# Patient Record
Sex: FEMALE | Race: WHITE | NOT HISPANIC OR LATINO
[De-identification: names, ages, dates, MRNs, and addresses within clinical notes are randomized per-mention and may not be internally consistent; named-entity substitution may affect disease eponyms.]

---

## 2017-10-10 ENCOUNTER — RESULT REVIEW (OUTPATIENT)
Age: 57
End: 2017-10-10

## 2022-11-02 ENCOUNTER — APPOINTMENT (OUTPATIENT)
Dept: ORTHOPEDIC SURGERY | Facility: CLINIC | Age: 62
End: 2022-11-02

## 2022-11-02 DIAGNOSIS — M47.816 SPONDYLOSIS W/OUT MYELOPATHY OR RADICULOPATHY, LUMBAR REGION: ICD-10-CM

## 2022-11-02 PROCEDURE — 72170 X-RAY EXAM OF PELVIS: CPT

## 2022-11-02 PROCEDURE — 99214 OFFICE O/P EST MOD 30 MIN: CPT

## 2022-11-02 PROCEDURE — 72110 X-RAY EXAM L-2 SPINE 4/>VWS: CPT

## 2022-11-02 NOTE — HISTORY OF PRESENT ILLNESS
[Lower back] : lower back [Gradual] : gradual [Sudden] : sudden [6] : 6 [5] : 5 [Burning] : burning [Shooting] : shooting [Stabbing] : stabbing [Intermittent] : intermittent [Rest] : rest [Exercising] : exercising [Retired] : Work status: retired [de-identified] : 11/02/2022: 62 year old female here for eval of Left sided low back pain for about a month. No injury. Pain in buttock with radiation down posterior leg into plantar foot. Denies Right sided radicular sx/ N/ T/ weakness. Denies b/b incontinence. Pain worse with standing/ walking long periods.Worse at end of the day. Has nighttime awakening sx.\par \par No h/o Spine Surgery\par No prior PT/ injections/ acupuncture/ chiropractor\par \par PMHx: No DM/ cancer/ no blood thinners. hypothryouid \par Takes meloxicam for R knee - saw Dr Savage for her knee in the past - now gets her medication from the PCP \par \par Xrays today:\par L spine - spondylolisthesis at L5-S1 \par AP pelvis - no obvious fracture \par \par retired \par \par   [] : Post Surgical Visit: no [FreeTextEntry1] : l spine  [FreeTextEntry5] : Pt has been having lower back pain and shooting pain down her left leg as well as numbness and tingling for the past few weeks with a gradual onset of pain  [FreeTextEntry7] : down into the left leg  [de-identified] : none

## 2022-11-02 NOTE — DISCUSSION/SUMMARY
[de-identified] : reviewed the case and the optoins with her \par lumbar spondylolisthesis with radiculopathy \par would add MDP \par PT we discussed but she is not interested\par MRI L spine \par fu to review the MRI

## 2022-11-08 ENCOUNTER — APPOINTMENT (OUTPATIENT)
Dept: MRI IMAGING | Facility: CLINIC | Age: 62
End: 2022-11-08

## 2022-11-08 ENCOUNTER — FORM ENCOUNTER (OUTPATIENT)
Age: 62
End: 2022-11-08

## 2022-11-09 ENCOUNTER — APPOINTMENT (OUTPATIENT)
Dept: MRI IMAGING | Facility: CLINIC | Age: 62
End: 2022-11-09

## 2022-11-09 PROCEDURE — 72148 MRI LUMBAR SPINE W/O DYE: CPT

## 2022-11-16 ENCOUNTER — APPOINTMENT (OUTPATIENT)
Dept: ORTHOPEDIC SURGERY | Facility: CLINIC | Age: 62
End: 2022-11-16

## 2022-11-28 ENCOUNTER — APPOINTMENT (OUTPATIENT)
Dept: ORTHOPEDIC SURGERY | Facility: CLINIC | Age: 62
End: 2022-11-28

## 2022-11-28 PROCEDURE — 99214 OFFICE O/P EST MOD 30 MIN: CPT

## 2022-11-28 NOTE — HISTORY OF PRESENT ILLNESS
[Lower back] : lower back [Gradual] : gradual [Sudden] : sudden [6] : 6 [5] : 5 [Burning] : burning [Shooting] : shooting [Stabbing] : stabbing [Intermittent] : intermittent [Rest] : rest [Exercising] : exercising [Retired] : Work status: retired [de-identified] : 11/02/2022: 62 year old female here for eval of Left sided low back pain for about a month. No injury. Pain in buttock with radiation down posterior leg into plantar foot. Denies Right sided radicular sx/ N/ T/ weakness. Denies b/b incontinence. Pain worse with standing/ walking long periods.Worse at end of the day. Has nighttime awakening sx.\par \par No h/o Spine Surgery\par No prior PT/ injections/ acupuncture/ chiropractor\par \par PMHx: No DM/ cancer/ no blood thinners. hypothryouid \par Takes meloxicam for R knee - saw Dr Savage for her knee in the past - now gets her medication from the PCP \par \par Xrays today:\par L spine - spondylolisthesis at L5-S1 \par AP pelvis - no obvious fracture \par \par retired \par \par 11/28/22: here for fu - plan at last was "reviewed the case and the optoins with her \par lumbar spondylolisthesis with radiculopathy \par would add MDP \par PT we discussed but she is not interested\par MRI L spine \par fu to review the MRI" - overall the pain remains in the left leg - the MDP didn’t really help - not interested in PT - right leg is okay \par \par MRi L spine - \par 1. Convexity of lumbar spine towards the left, exaggerated lumbar lordosis, anterolisthesis at L5-S1, and \par transitional anatomy at the lumbosacral junction with vestigial S1-S2 disc segment.\par 2. Bilateral foraminal herniations encroaching upon bilateral exiting L3 nerve roots at L3-L4, encroachment \par upon the left S1 nerve root at L5-S1, small perineural cysts at the level of S1-S2 where there is a vestigial disc \par segment, and multilevel disc bulging.\par 3. No acute fracture.\par \par   [] : Post Surgical Visit: no [FreeTextEntry1] : l spine  [FreeTextEntry5] : Pt has been having lower back pain and shooting pain down her left leg as well as numbness and tingling for the past few weeks with a gradual onset of pain  [FreeTextEntry7] : down into the left leg  [de-identified] : none

## 2022-11-28 NOTE — DISCUSSION/SUMMARY
[de-identified] : reviewed the MRI with her \par discussion of tx optoins \par L4-5 spondylolisthesis\par referral to pain for LESi \par would be a candidate for surgery which would be TLIF L4-5 \par would trial gabapentin \par \par *the problem is at the level of the spondylolisthesis which should make it easy to identify for treatment purposes - the MRi report is reading it at L5-S1 - in my notes it was read as L4-5 - either way its the level with the spondylolisthesis

## 2022-12-12 ENCOUNTER — APPOINTMENT (OUTPATIENT)
Dept: PAIN MANAGEMENT | Facility: CLINIC | Age: 62
End: 2022-12-12

## 2022-12-12 VITALS — HEIGHT: 62 IN | WEIGHT: 105 LBS | BODY MASS INDEX: 19.32 KG/M2

## 2022-12-12 PROCEDURE — 99204 OFFICE O/P NEW MOD 45 MIN: CPT

## 2022-12-12 NOTE — HISTORY OF PRESENT ILLNESS
[Lower back] : lower back [5] : 5 [Radiating] : radiating [Tingling] : tingling [Constant] : constant [Retired] : Work status: retired [de-identified] : Initial HPI 12/12/2022:\par Pain started October 2022 and on the LEFT lower posterior leg to the bottom of the foot described as a tightness with associated numbness and tingling in the foot. Pin is worse with going up stairs. Saw Dr. Aviles who recommended LESI and PT but not interested since it never helped her. Pt would be a candidate for a L4-5 TLIF. \par \par MRI L-spine 11/9/22 independently reviewed: L4-5 sponylolisthesis (MRI report says L5-S1) \par Physical Therapy: No \par Pain Medications: tried gabapentin with no relief \par Past Injections: none\par Spine surgery: none \par Blood thinners: none [] : no [FreeTextEntry6] : pins and needles  [FreeTextEntry7] : left leg  [de-identified] : depends on the activity of the day

## 2022-12-12 NOTE — DISCUSSION/SUMMARY
[de-identified] : After discussing various treatment options with the patient including but not limited to oral medications, physical therapy, exercise modalities as well as interventional spinal injections, we have decided with the following plan:\par \par - Continue Home exercises, stretching, activity modification, physical therapy, and conservative care.\par - MRI report and/or images was reviewed and discussed with the patient.\par - Recommend Left L4-5, L5-S1 Transforaminal Epidural Steroid Injection under fluoroscopic guidance with image.\par - The risks, benefits and alternatives of the proposed procedure were explained in detail with the patient. The risks outlined include but are not limited to infection, bleeding, post-dural puncture headache, nerve injury, a temporary increase in pain, failure to resolve symptoms, allergic reaction, symptom recurrence, and possible elevation of blood sugar in diabetics. All questions were answered to patient's apparent satisfaction and he/she verbalized an understanding.\par - Patient is presenting with acute/sub-acute radicular pain with impairment in ADLs and functionality.  The pain has not responded to conservative care including NSAID therapy and/or physical therapy.  There is no bleeding tendency, unstable medical condition, or systemic infection.\par - Follow up in 1-2 weeks post injection for re-evaluation.\par

## 2022-12-12 NOTE — PHYSICAL EXAM
[de-identified] : Constitutional; Appears well, no apparent distress\par Ability to communicate: Normal \par Respiratory: non-labored breathing\par Skin: No rash noted\par Head: Normocephalic, atraumatic\par Neck: no visible thyroid enlargement\par Eyes: Extraocular movements intact\par Neurologic: Alert and oriented x3\par Psychiatric: normal mood, affect and behavior \par \par  [] : non-antalgic

## 2023-01-03 ENCOUNTER — NON-APPOINTMENT (OUTPATIENT)
Age: 63
End: 2023-01-03

## 2023-01-10 ENCOUNTER — APPOINTMENT (OUTPATIENT)
Dept: PAIN MANAGEMENT | Facility: CLINIC | Age: 63
End: 2023-01-10
Payer: COMMERCIAL

## 2023-01-10 PROCEDURE — 64483 NJX AA&/STRD TFRM EPI L/S 1: CPT | Mod: LT

## 2023-01-10 PROCEDURE — J3490M: CUSTOM

## 2023-01-10 PROCEDURE — 64484 NJX AA&/STRD TFRM EPI L/S EA: CPT | Mod: LT,59

## 2023-01-10 NOTE — PROCEDURE
[FreeTextEntry3] : Date of Service: 01/10/2023 \par \par Account: [id number]\par \par Patient: AZAM MCLEAN \par \par YOB: 1960\par \par Age: 62 year\par \par \par Surgeon:                                                        Jair Dumont D.O.\par \par Assistant:                                                        None\par \par Pre-Operative Diagnosis:                            Lumbosacral radiculitis (M54.17)\par \par Post-Operative Diagnosis:                          Same\par \par Procedure:                                                     Left L4-5, L5-S1, transforaminal epidural steroid injection under fluoroscopic guidance.\par \par Anesthesia:                                                    Local with MAC\par \par This procedure was carried out using fluoroscopic guidance.  The risks and benefits of the procedure were discussed extensively with the patient.  The consent of the patient was obtained and the following procedure was performed. The patient was placed in the prone position on the fluoroscopic table and the lumbar area was prepped and draped in a sterile fashion. A timeout was performed with all essential staff present and the site and side were verified.\par \par The Left L4-5, L5-S1 neural foramen were identified on left oblique "madhavi dog" anatomical view at the 6 o'clock position using fluoroscopic guidance, and the area was marked. The overlying skin and subcutaneous structures were anesthetized using sterile technique with 1% Lidocaine.  A 22-gauge spinal needle was directed toward the inferior (6 o'clock) position of the pedicle, which formed the roof of the identified foramen.  Once in the epidural space, after negative aspiration for heme and CSF, 1cc of Omnipaque contrast was injected under live fluoroscopy to confirm epidural location and assess filling defects and rule out intravascular needle placement. \par \par The following contrast flow and epidurogram was observed: no intravascular or intrathecal flow pattern was noted.  No blood or CSF was aspirated. Contrast spread appeared to outline the left L4 and L5 nerve roots and spread medially into the epidural space.  \par \par After this, 3 ml of a total mixture of 12 mg betamethasone, 2 ml of preservative free normal saline, and 2 ml of 0.25% bupivacaine was administered without any resistance in the epidural space at each of the two levels. \par \par The needle was subsequently removed.  Vital signs remained normal.  Pulse oximeter was used throughout the procedure and the patient's pulse and oxygen saturation remained within normal limits.  The patient tolerated the procedure well.  There were no complications.  The patient was instructed to apply ice over the injection sites for twenty minutes every two hours for the next 24 to 48 hours.  The patient was also instructed to contact me immediately if there were any problems.\par \par Jair Dumont D.O.\par

## 2023-01-23 ENCOUNTER — APPOINTMENT (OUTPATIENT)
Dept: PAIN MANAGEMENT | Facility: CLINIC | Age: 63
End: 2023-01-23
Payer: COMMERCIAL

## 2023-01-23 VITALS — WEIGHT: 105 LBS | HEIGHT: 62 IN | BODY MASS INDEX: 19.32 KG/M2

## 2023-01-23 DIAGNOSIS — M51.26 OTHER INTERVERTEBRAL DISC DISPLACEMENT, LUMBAR REGION: ICD-10-CM

## 2023-01-23 PROCEDURE — 99214 OFFICE O/P EST MOD 30 MIN: CPT

## 2023-01-23 NOTE — DISCUSSION/SUMMARY
[de-identified] : After discussing various treatment options with the patient including but not limited to oral medications, physical therapy, exercise modalities as well as interventional spinal injections, we have decided with the following plan:\par \par - Continue Home exercises, stretching, activity modification, physical therapy, and conservative care.\par - MRI report and/or images was reviewed and discussed with the patient.\par - Recommend L5-S1 Lumbar Epidural Steroid Injection under fluoroscopic guidance with image. (LEFT) \par - The risks, benefits and alternatives of the proposed procedure were explained in detail with the patient. The risks outlined include but are not limited to infection, bleeding, post-dural puncture headache, nerve injury, a temporary increase in pain, failure to resolve symptoms, allergic reaction, symptom recurrence, and possible elevation of blood sugar in diabetics. All questions were answered to patient's apparent satisfaction and he/she verbalized an understanding.\par - Patient is presenting with acute/sub-acute radicular pain with impairment in ADLs and functionality.  The pain has not responded to conservative care including NSAID therapy and/or physical therapy.  There is no bleeding tendency, unstable medical condition, or systemic infection.\par - Follow up in 1-2 weeks post injection for re-evaluation.\par

## 2023-01-23 NOTE — PHYSICAL EXAM
[de-identified] : Constitutional; Appears well, no apparent distress\par Ability to communicate: Normal \par Respiratory: non-labored breathing\par Skin: No rash noted\par Head: Normocephalic, atraumatic\par Neck: no visible thyroid enlargement\par Eyes: Extraocular movements intact\par Neurologic: Alert and oriented x3\par Psychiatric: normal mood, affect and behavior \par \par  [] : non-antalgic

## 2023-01-23 NOTE — HISTORY OF PRESENT ILLNESS
[Lower back] : lower back [5] : 5 [Radiating] : radiating [Tingling] : tingling [Retired] : Work status: retired [1] : 2 [Occasional] : occasional [de-identified] : Initial HPI 12/12/2022:\par Pain started October 2022 and on the LEFT lower posterior leg to the bottom of the foot described as a tightness with associated numbness and tingling in the foot. Pin is worse with going up stairs. Saw Dr. Aviles who recommended LESI and PT but not interested since it never helped her. Pt would be a candidate for a L4-5 TLIF. \par \par MRI L-spine 11/9/22 independently reviewed: L4-5 sponylolisthesis (MRI report says L5-S1) \par Physical Therapy: No \par Pain Medications: tried gabapentin with no relief \par Past Injections: none\par Spine surgery: none \par Blood thinners: none [] : no [FreeTextEntry1] : left  [FreeTextEntry6] : pins and needles  [FreeTextEntry7] : left leg  [de-identified] : depends on the activity of the day

## 2023-02-07 ENCOUNTER — APPOINTMENT (OUTPATIENT)
Dept: PAIN MANAGEMENT | Facility: CLINIC | Age: 63
End: 2023-02-07
Payer: COMMERCIAL

## 2023-02-07 PROCEDURE — 62323 NJX INTERLAMINAR LMBR/SAC: CPT

## 2023-02-07 NOTE — PROCEDURE
[FreeTextEntry3] : Date of Service: 02/07/2023 \par \par Account: [id number]\par \par Patient: AZAM MCLEAN \par \par YOB: 1960\par \par Age: 62 year\par \par \par Surgeon:                                                         Jair Dumont D.O.\par \par Pre-Operative Diagnosis:                             Lumbosacral radiculitis\par \par Post-Operative Diagnosis:                           Same\par \par Procedure:                                                      Interlaminar lumbar epidural steroid injection (L5-S1) under fluoroscopic guidance\par \par Anesthesia:                                                     Local with MAC\par \par \par This procedure was carried out using fluoroscopic guidance.  The risks and benefits of the procedure were discussed extensively with the patient.  The consent of the patient was obtained and the following procedure was performed.\par \par The patient was placed in the prone position.  The lumbar area was prepped and draped in a sterile fashion.  A timeout was performed with all essential staff present and the site and side were verified. Under AP view with slight cephalad-caudad angulation, the L5-S1 interspace was identified and marked.  Using sterile technique, the superficial skin was anesthetized with 1% Lidocaine without epinephrine.  A 20-gauge Tuohy needle was advanced into the epidural space under fluoroscopy using [id number] technique and using loss of resistance at the L5-S1 level.  After negative aspiration for heme or CSF, an epidurogram was obtained using 2-3 cc Omnipaque contrast injected under live fluoroscopy, confirming epidural placement of the needle.  \par \par Epidurogram showed no evidence of intrathecal or intravascular flow, and good evidence of bilateral epidural flow from L3-S2 levels.  After this, 4 cc of preservative free normal saline plus 12 mg of betamethasone were injected into the epidural space.\par \par The needle was subsequently removed.  Anesthesia personnel were present throughout the procedure.\par \par The patient tolerated the procedure well and was instructed to contact me immediately if there were any problems.\par \par Jair Dumont D.O.\par

## 2023-02-27 ENCOUNTER — APPOINTMENT (OUTPATIENT)
Dept: PAIN MANAGEMENT | Facility: CLINIC | Age: 63
End: 2023-02-27
Payer: COMMERCIAL

## 2023-02-27 ENCOUNTER — TRANSCRIPTION ENCOUNTER (OUTPATIENT)
Age: 63
End: 2023-02-27

## 2023-02-27 VITALS — WEIGHT: 105 LBS | BODY MASS INDEX: 19.32 KG/M2 | HEIGHT: 62 IN

## 2023-02-27 DIAGNOSIS — M54.16 RADICULOPATHY, LUMBAR REGION: ICD-10-CM

## 2023-02-27 DIAGNOSIS — M54.17 RADICULOPATHY, LUMBOSACRAL REGION: ICD-10-CM

## 2023-02-27 DIAGNOSIS — M43.16 SPONDYLOLISTHESIS, LUMBAR REGION: ICD-10-CM

## 2023-02-27 PROCEDURE — 99214 OFFICE O/P EST MOD 30 MIN: CPT

## 2023-02-27 NOTE — PHYSICAL EXAM
[de-identified] : Constitutional; Appears well, no apparent distress\par Ability to communicate: Normal \par Respiratory: non-labored breathing\par Skin: No rash noted\par Head: Normocephalic, atraumatic\par Neck: no visible thyroid enlargement\par Eyes: Extraocular movements intact\par Neurologic: Alert and oriented x3\par Psychiatric: normal mood, affect and behavior \par \par  [] : non-antalgic

## 2023-02-27 NOTE — HISTORY OF PRESENT ILLNESS
[Lower back] : lower back [1] : 2 [Radiating] : radiating [Tingling] : tingling [Occasional] : occasional [Retired] : Work status: retired [4] : 4 [] : no [FreeTextEntry1] : left  [FreeTextEntry6] : pins and needles  [FreeTextEntry7] : left leg  [de-identified] : depends on the activity of the day

## 2023-02-27 NOTE — DISCUSSION/SUMMARY
[de-identified] : After discussing various treatment options with the patient including but not limited to oral medications, physical therapy, exercise modalities as well as interventional spinal injections, we have decided with the following plan:\par \par - Continue Home exercises, stretching, activity modification, physical therapy, and conservative care.\par - MRI report and/or images was reviewed and discussed with the patient.\par - Recommend Left L4-5, L5-S1 Transforaminal Epidural Steroid Injection under fluoroscopic guidance with image.\par - The risks, benefits and alternatives of the proposed procedure were explained in detail with the patient. The risks outlined include but are not limited to infection, bleeding, post-dural puncture headache, nerve injury, a temporary increase in pain, failure to resolve symptoms, allergic reaction, symptom recurrence, and possible elevation of blood sugar in diabetics. All questions were answered to patient's apparent satisfaction and he/she verbalized an understanding.\par - Patient is presenting with acute/sub-acute radicular pain with impairment in ADLs and functionality.  The pain has not responded to conservative care including NSAID therapy and/or physical therapy.  There is no bleeding tendency, unstable medical condition, or systemic infection.\par - Follow up in 1-2 weeks post injection for re-evaluation.\par - Will call to schedule.\par

## 2023-03-08 ENCOUNTER — APPOINTMENT (OUTPATIENT)
Dept: UROLOGY | Facility: CLINIC | Age: 63
End: 2023-03-08
Payer: COMMERCIAL

## 2023-03-08 VITALS
OXYGEN SATURATION: 99 % | WEIGHT: 104 LBS | TEMPERATURE: 97.5 F | BODY MASS INDEX: 19.14 KG/M2 | DIASTOLIC BLOOD PRESSURE: 70 MMHG | HEART RATE: 70 BPM | HEIGHT: 62 IN | SYSTOLIC BLOOD PRESSURE: 108 MMHG | RESPIRATION RATE: 16 BRPM

## 2023-03-08 PROCEDURE — 99204 OFFICE O/P NEW MOD 45 MIN: CPT

## 2023-03-09 NOTE — HISTORY OF PRESENT ILLNESS
[FreeTextEntry1] : 62 year old F with cc of microscopic hematuria. Pt was seen by PCP for routine exam and found to have blood in the urine. Review of UAs shows >14 RBC/hpf. She has never been told this in the past. Denies gross hematuria. No hx of stones. No issues with UTIs. No tobacco hx. No exposure hx. No family hx of  malignancy. \par \par At baseline, usually goes every 3 hours with increased frequency with exercise, endorses one episode of nocturia/night 4 times out of the week. Denies dysuria, urgency, NICK, gross hematuria. Denies constipation. Drinks about 40 oz of water per day, no coffee/tea.

## 2023-03-09 NOTE — ASSESSMENT
[FreeTextEntry1] : 62 year old woman with microscopic hematuria on outpatient UA. Recommend eval\par --Cystoscopy\par --CT urogram

## 2023-03-22 ENCOUNTER — APPOINTMENT (OUTPATIENT)
Dept: CT IMAGING | Facility: CLINIC | Age: 63
End: 2023-03-22
Payer: COMMERCIAL

## 2023-03-22 ENCOUNTER — OUTPATIENT (OUTPATIENT)
Dept: OUTPATIENT SERVICES | Facility: HOSPITAL | Age: 63
LOS: 1 days | End: 2023-03-22
Payer: COMMERCIAL

## 2023-03-22 DIAGNOSIS — R31.29 OTHER MICROSCOPIC HEMATURIA: ICD-10-CM

## 2023-03-22 PROCEDURE — 74178 CT ABD&PLV WO CNTR FLWD CNTR: CPT

## 2023-03-22 PROCEDURE — 74178 CT ABD&PLV WO CNTR FLWD CNTR: CPT | Mod: 26

## 2023-03-23 ENCOUNTER — NON-APPOINTMENT (OUTPATIENT)
Age: 63
End: 2023-03-23

## 2023-04-21 ENCOUNTER — APPOINTMENT (OUTPATIENT)
Dept: UROLOGY | Facility: CLINIC | Age: 63
End: 2023-04-21
Payer: COMMERCIAL

## 2023-04-21 ENCOUNTER — OUTPATIENT (OUTPATIENT)
Dept: OUTPATIENT SERVICES | Facility: HOSPITAL | Age: 63
LOS: 1 days | End: 2023-04-21
Payer: COMMERCIAL

## 2023-04-21 VITALS
RESPIRATION RATE: 16 BRPM | DIASTOLIC BLOOD PRESSURE: 71 MMHG | HEART RATE: 64 BPM | TEMPERATURE: 98 F | SYSTOLIC BLOOD PRESSURE: 121 MMHG

## 2023-04-21 DIAGNOSIS — R31.29 OTHER MICROSCOPIC HEMATURIA: ICD-10-CM

## 2023-04-21 DIAGNOSIS — R35.0 FREQUENCY OF MICTURITION: ICD-10-CM

## 2023-04-21 PROCEDURE — 52000 CYSTOURETHROSCOPY: CPT

## 2023-04-21 PROCEDURE — 99213 OFFICE O/P EST LOW 20 MIN: CPT | Mod: 25

## 2023-05-01 PROBLEM — R31.29 MICROSCOPIC HEMATURIA: Status: ACTIVE | Noted: 2023-03-08

## 2023-05-01 NOTE — HISTORY OF PRESENT ILLNESS
[FreeTextEntry1] : 62 year old F with cc of microscopic hematuria. Pt was seen by PCP for routine exam and found to have blood in the urine. Review of UAs shows >14 RBC/hpf. She has never been told this in the past. Denies gross hematuria. No hx of stones. No issues with UTIs. No tobacco hx. No exposure hx. No family hx of  malignancy. \par \par At baseline, usually goes every 3 hours with increased frequency with exercise, endorses one episode of nocturia/night 4 times out of the week. Denies dysuria, urgency, NICK, gross hematuria. Denies constipation. Drinks about 40 oz of water per day, no coffee/tea.\par \par Plan made for eval. Cysto today negative. CT with B stones. Reviewed imaging. Pt with RUP x2 (totalling 5mm), RMP x2 (1-3mm each), RLP x1 (6mm), and collection of stones on LLP with predominant stone ~10mm in size.

## 2023-05-01 NOTE — PHYSICAL EXAM
[General Appearance - Well Developed] : well developed [General Appearance - Well Nourished] : well nourished [Normal Appearance] : normal appearance [Abdomen Soft] : soft [Abdomen Tenderness] : non-tender [Costovertebral Angle Tenderness] : no ~M costovertebral angle tenderness [Edema] : no peripheral edema [] : no respiratory distress [Exaggerated Use Of Accessory Muscles For Inspiration] : no accessory muscle use [Oriented To Time, Place, And Person] : oriented to person, place, and time [Normal Station and Gait] : the gait and station were normal for the patient's age [Urethral Meatus] : normal urethra [External Female Genitalia] : normal external genitalia

## 2023-05-01 NOTE — ASSESSMENT
[FreeTextEntry1] : Large stone burden with no prior hx. Has osteoporosis that may be contributing. \par --Increase fluid intake\par --24h urine x2\par --RTC in 1mo\par \par Microscopic hematuria. Suspect stones as etiology. Anatomic eval otherwise negative.

## 2023-05-03 DIAGNOSIS — R31.29 OTHER MICROSCOPIC HEMATURIA: ICD-10-CM

## 2023-05-03 DIAGNOSIS — N20.0 CALCULUS OF KIDNEY: ICD-10-CM

## 2023-05-24 ENCOUNTER — APPOINTMENT (OUTPATIENT)
Dept: UROLOGY | Facility: CLINIC | Age: 63
End: 2023-05-24
Payer: COMMERCIAL

## 2023-05-24 PROCEDURE — 99214 OFFICE O/P EST MOD 30 MIN: CPT

## 2023-05-24 NOTE — PHYSICAL EXAM
[General Appearance - Well Developed] : well developed [General Appearance - Well Nourished] : well nourished [Normal Appearance] : normal appearance [Abdomen Soft] : soft [Abdomen Tenderness] : non-tender [Costovertebral Angle Tenderness] : no ~M costovertebral angle tenderness [Urethral Meatus] : normal urethra [External Female Genitalia] : normal external genitalia [Edema] : no peripheral edema [] : no respiratory distress [Exaggerated Use Of Accessory Muscles For Inspiration] : no accessory muscle use [Oriented To Time, Place, And Person] : oriented to person, place, and time [Normal Station and Gait] : the gait and station were normal for the patient's age

## 2023-05-30 ENCOUNTER — TRANSCRIPTION ENCOUNTER (OUTPATIENT)
Age: 63
End: 2023-05-30

## 2023-05-30 LAB
CALCIUM SERPL-MCNC: 10 MG/DL
CALCIUM SERPL-MCNC: 10 MG/DL
PARATHYROID HORMONE INTACT: 22 PG/ML

## 2023-06-02 NOTE — HISTORY OF PRESENT ILLNESS
[FreeTextEntry1] : 62 year old F with cc of stones and hx of microscopic hematuria. Pt was seen by PCP for routine exam and found to have blood in the urine. Review of UAs shows >14 RBC/hpf. She has never been told this in the past. Denies gross hematuria. No hx of stones. No issues with UTIs. No tobacco hx. No exposure hx. No family hx of  malignancy. \par \par At baseline, usually goes every 3 hours with increased frequency with exercise, endorses one episode of nocturia/night 4 times out of the week. Denies dysuria, urgency, NICK, gross hematuria. Denies constipation. Drinks about 40 oz of water per day, no coffee/tea.\par \par Plan made for eval. Cysto negative. CT with B stones. Reviewed imaging. Pt with RUP x2 (totalling 5mm), RMP x2 (1-3mm each), RLP x1 (6mm), and collection of stones on LLP with predominant stone ~10mm in size. Plan made for 24h urine for eval. \par \par Reviewed this with pt today. This shows low urine volumes (1.4-1.5L), extremely high urinary calcium (>320) and borderline elevated oxalate. She does not believe she eats too much calcium but is on supplementation so is not sure. No recent blood work for eval for hypercalcemia

## 2023-06-02 NOTE — ASSESSMENT
[FreeTextEntry1] : Large stone burden with no prior hx. Has hypercalcemia and low urine volumes as primary risk factor. \par I spoke with the patient about diet modification at length, which includes:\par --Increasing fluid intake to produce 2 to 2.5 liters of urine per day (approx 3 liters intake), and should be primarily water. \par --Calcium intake should be approximately 1000 mg per day. Will obtain food diary to count the daily dietary intake of ca to ensure wnl. \par --Serum Ca and PTH\par --Will discuss food diary results next week with pt to determine need for thiazide or not\par \par

## 2023-06-09 ENCOUNTER — TRANSCRIPTION ENCOUNTER (OUTPATIENT)
Age: 63
End: 2023-06-09

## 2023-06-12 ENCOUNTER — TRANSCRIPTION ENCOUNTER (OUTPATIENT)
Age: 63
End: 2023-06-12

## 2023-07-11 RX ORDER — CHLORTHALIDONE 25 MG/1
25 TABLET ORAL DAILY
Qty: 15 | Refills: 5 | Status: DISCONTINUED | COMMUNITY
Start: 2023-07-11 | End: 2023-07-11

## 2023-07-12 ENCOUNTER — TRANSCRIPTION ENCOUNTER (OUTPATIENT)
Age: 63
End: 2023-07-12

## 2023-08-31 ENCOUNTER — TRANSCRIPTION ENCOUNTER (OUTPATIENT)
Age: 63
End: 2023-08-31

## 2023-09-13 ENCOUNTER — APPOINTMENT (OUTPATIENT)
Dept: UROLOGY | Facility: CLINIC | Age: 63
End: 2023-09-13
Payer: COMMERCIAL

## 2023-09-13 VITALS
SYSTOLIC BLOOD PRESSURE: 104 MMHG | HEART RATE: 96 BPM | TEMPERATURE: 97.5 F | RESPIRATION RATE: 16 BRPM | OXYGEN SATURATION: 100 % | DIASTOLIC BLOOD PRESSURE: 68 MMHG

## 2023-09-13 PROCEDURE — 99213 OFFICE O/P EST LOW 20 MIN: CPT

## 2023-10-06 ENCOUNTER — APPOINTMENT (OUTPATIENT)
Dept: UROLOGY | Facility: CLINIC | Age: 63
End: 2023-10-06
Payer: COMMERCIAL

## 2023-10-06 ENCOUNTER — OUTPATIENT (OUTPATIENT)
Dept: OUTPATIENT SERVICES | Facility: HOSPITAL | Age: 63
LOS: 1 days | End: 2023-10-06
Payer: COMMERCIAL

## 2023-10-06 DIAGNOSIS — R35.0 FREQUENCY OF MICTURITION: ICD-10-CM

## 2023-10-06 PROCEDURE — 76775 US EXAM ABDO BACK WALL LIM: CPT | Mod: 26

## 2023-10-06 PROCEDURE — 99213 OFFICE O/P EST LOW 20 MIN: CPT

## 2023-10-06 PROCEDURE — 76775 US EXAM ABDO BACK WALL LIM: CPT

## 2023-10-11 DIAGNOSIS — R82.994 HYPERCALCIURIA: ICD-10-CM

## 2023-10-11 DIAGNOSIS — N20.0 CALCULUS OF KIDNEY: ICD-10-CM

## 2023-11-20 ENCOUNTER — APPOINTMENT (OUTPATIENT)
Dept: NEPHROLOGY | Facility: CLINIC | Age: 63
End: 2023-11-20
Payer: COMMERCIAL

## 2023-11-20 VITALS
TEMPERATURE: 99.2 F | OXYGEN SATURATION: 96 % | SYSTOLIC BLOOD PRESSURE: 115 MMHG | WEIGHT: 103.62 LBS | BODY MASS INDEX: 19.07 KG/M2 | HEIGHT: 62 IN | HEART RATE: 82 BPM | DIASTOLIC BLOOD PRESSURE: 72 MMHG

## 2023-11-20 DIAGNOSIS — Z84.1 FAMILY HISTORY OF DISORDERS OF KIDNEY AND URETER: ICD-10-CM

## 2023-11-20 DIAGNOSIS — Z78.9 OTHER SPECIFIED HEALTH STATUS: ICD-10-CM

## 2023-11-20 DIAGNOSIS — M81.0 AGE-RELATED OSTEOPOROSIS W/OUT CURRENT PATHOLOGICAL FRACTURE: ICD-10-CM

## 2023-11-20 PROCEDURE — 99205 OFFICE O/P NEW HI 60 MIN: CPT

## 2023-12-21 LAB
25(OH)D3 SERPL-MCNC: 53.8 NG/ML
ALBUMIN SERPL ELPH-MCNC: 4.8 G/DL
ANION GAP SERPL CALC-SCNC: 15 MMOL/L
APPEARANCE: CLEAR
BACTERIA: NEGATIVE /HPF
BILIRUBIN URINE: NEGATIVE
BLOOD URINE: NEGATIVE
BUN SERPL-MCNC: 17 MG/DL
CALCIUM SERPL-MCNC: 9.8 MG/DL
CALCIUM SERPL-MCNC: 9.8 MG/DL
CAST: 0 /LPF
CHLORIDE SERPL-SCNC: 96 MMOL/L
CO2 SERPL-SCNC: 29 MMOL/L
COLOR: YELLOW
CREAT SERPL-MCNC: 0.69 MG/DL
CREAT SPEC-SCNC: 58 MG/DL
CREAT/PROT UR: 0.2 RATIO
EGFR: 97 ML/MIN/1.73M2
EPITHELIAL CELLS: 0 /HPF
GLUCOSE QUALITATIVE U: NEGATIVE MG/DL
GLUCOSE SERPL-MCNC: 106 MG/DL
KETONES URINE: NEGATIVE MG/DL
LEUKOCYTE ESTERASE URINE: ABNORMAL
MICROSCOPIC-UA: NORMAL
NITRITE URINE: NEGATIVE
PARATHYROID HORMONE INTACT: 26 PG/ML
PH URINE: 6.5
PHOSPHATE SERPL-MCNC: 3.3 MG/DL
POTASSIUM SERPL-SCNC: 3.5 MMOL/L
PROT UR-MCNC: 10 MG/DL
PROTEIN URINE: NEGATIVE MG/DL
RED BLOOD CELLS URINE: 1 /HPF
SODIUM SERPL-SCNC: 139 MMOL/L
SPECIFIC GRAVITY URINE: 1.01
URATE SERPL-MCNC: 4.1 MG/DL
UROBILINOGEN URINE: 0.2 MG/DL
VIT A SERPL-MCNC: 44 UG/DL
WHITE BLOOD CELLS URINE: 3 /HPF

## 2024-01-16 ENCOUNTER — NON-APPOINTMENT (OUTPATIENT)
Age: 64
End: 2024-01-16

## 2024-02-09 ENCOUNTER — APPOINTMENT (OUTPATIENT)
Dept: CARDIOLOGY | Facility: CLINIC | Age: 64
End: 2024-02-09
Payer: COMMERCIAL

## 2024-02-09 ENCOUNTER — NON-APPOINTMENT (OUTPATIENT)
Age: 64
End: 2024-02-09

## 2024-02-09 VITALS
OXYGEN SATURATION: 100 % | HEART RATE: 69 BPM | DIASTOLIC BLOOD PRESSURE: 69 MMHG | WEIGHT: 103 LBS | HEIGHT: 62 IN | SYSTOLIC BLOOD PRESSURE: 103 MMHG | BODY MASS INDEX: 18.95 KG/M2

## 2024-02-09 DIAGNOSIS — I45.10 UNSPECIFIED RIGHT BUNDLE-BRANCH BLOCK: ICD-10-CM

## 2024-02-09 DIAGNOSIS — Z82.49 FAMILY HISTORY OF ISCHEMIC HEART DISEASE AND OTHER DISEASES OF THE CIRCULATORY SYSTEM: ICD-10-CM

## 2024-02-09 DIAGNOSIS — U07.1 COVID-19: ICD-10-CM

## 2024-02-09 DIAGNOSIS — Z82.3 FAMILY HISTORY OF STROKE: ICD-10-CM

## 2024-02-09 DIAGNOSIS — E03.9 HYPOTHYROIDISM, UNSPECIFIED: ICD-10-CM

## 2024-02-09 PROCEDURE — 99204 OFFICE O/P NEW MOD 45 MIN: CPT

## 2024-02-09 RX ORDER — METHYLPREDNISOLONE 4 MG/1
4 TABLET ORAL
Qty: 1 | Refills: 1 | Status: DISCONTINUED | COMMUNITY
Start: 2022-11-02 | End: 2024-02-09

## 2024-02-09 RX ORDER — LEVOTHYROXINE SODIUM 25 UG/1
25 TABLET ORAL DAILY
Refills: 0 | Status: ACTIVE | COMMUNITY

## 2024-02-09 RX ORDER — GABAPENTIN 300 MG/1
300 CAPSULE ORAL
Qty: 60 | Refills: 0 | Status: DISCONTINUED | COMMUNITY
Start: 2022-11-28 | End: 2024-02-09

## 2024-02-09 NOTE — DISCUSSION/SUMMARY
[Stress Echocardiogram] : stress echocardiogram [___ Month(s)] : in [unfilled] month(s) [FreeTextEntry1] : 62 y/o with long but irreg hx of midsternal cp will evaluate with stress echo cholesterol is evelated carotid doppler. f/u 3 weeks

## 2024-02-09 NOTE — CARDIOLOGY SUMMARY
[de-identified] : Sinus Rhythm  Diffuse low voltage. -Incomplete right bundle branch block and left axis -anterior fascicular block.  ABNORMAL

## 2024-02-09 NOTE — HISTORY OF PRESENT ILLNESS
[FreeTextEntry1] : Pt presents with long hx of periodic mid sternal chest pain.  no radiation.,  longest duration 5-10 mins.  has to lie down because pain is so bad.  never went to ED.. no cardiac w.u.   no assoc n/v diaphoresis.  no sob,  pain does not radiate no heart murmur/ no rheumatic fever never smoked rare alco nkda c sect, dev sept mother had CVA in her 50s hx of sciatica, billat kidney stones, med managed,  tchol 242  trig 46 hdl 89

## 2024-02-21 ENCOUNTER — APPOINTMENT (OUTPATIENT)
Dept: ULTRASOUND IMAGING | Facility: CLINIC | Age: 64
End: 2024-02-21
Payer: COMMERCIAL

## 2024-02-21 ENCOUNTER — OUTPATIENT (OUTPATIENT)
Dept: OUTPATIENT SERVICES | Facility: HOSPITAL | Age: 64
LOS: 1 days | End: 2024-02-21
Payer: COMMERCIAL

## 2024-02-21 DIAGNOSIS — E78.00 PURE HYPERCHOLESTEROLEMIA, UNSPECIFIED: ICD-10-CM

## 2024-02-21 PROCEDURE — 93880 EXTRACRANIAL BILAT STUDY: CPT | Mod: 26

## 2024-02-21 PROCEDURE — 93880 EXTRACRANIAL BILAT STUDY: CPT

## 2024-03-01 ENCOUNTER — OUTPATIENT (OUTPATIENT)
Dept: OUTPATIENT SERVICES | Facility: HOSPITAL | Age: 64
LOS: 1 days | End: 2024-03-01
Payer: COMMERCIAL

## 2024-03-01 ENCOUNTER — RESULT REVIEW (OUTPATIENT)
Age: 64
End: 2024-03-01

## 2024-03-01 ENCOUNTER — APPOINTMENT (OUTPATIENT)
Dept: CV DIAGNOSITCS | Facility: HOSPITAL | Age: 64
End: 2024-03-01

## 2024-03-01 DIAGNOSIS — R07.9 CHEST PAIN, UNSPECIFIED: ICD-10-CM

## 2024-03-01 PROCEDURE — 93017 CV STRESS TEST TRACING ONLY: CPT

## 2024-03-01 PROCEDURE — 93018 CV STRESS TEST I&R ONLY: CPT

## 2024-03-01 PROCEDURE — C8930: CPT

## 2024-03-01 PROCEDURE — 93350 STRESS TTE ONLY: CPT | Mod: 26

## 2024-03-01 PROCEDURE — 93016 CV STRESS TEST SUPVJ ONLY: CPT

## 2024-03-04 ENCOUNTER — APPOINTMENT (OUTPATIENT)
Dept: NEPHROLOGY | Facility: CLINIC | Age: 64
End: 2024-03-04
Payer: COMMERCIAL

## 2024-03-04 VITALS
SYSTOLIC BLOOD PRESSURE: 106 MMHG | BODY MASS INDEX: 19.14 KG/M2 | DIASTOLIC BLOOD PRESSURE: 69 MMHG | HEIGHT: 62 IN | OXYGEN SATURATION: 98 % | RESPIRATION RATE: 16 BRPM | WEIGHT: 104 LBS | HEART RATE: 76 BPM | TEMPERATURE: 98.7 F

## 2024-03-04 PROCEDURE — G2211 COMPLEX E/M VISIT ADD ON: CPT

## 2024-03-04 PROCEDURE — 99215 OFFICE O/P EST HI 40 MIN: CPT

## 2024-03-04 RX ORDER — POTASSIUM CITRATE 10 MEQ/1
10 MEQ TABLET, EXTENDED RELEASE ORAL TWICE DAILY
Qty: 180 | Refills: 3 | Status: ACTIVE | COMMUNITY
Start: 2024-03-04 | End: 1900-01-01

## 2024-03-04 NOTE — HISTORY OF PRESENT ILLNESS
[FreeTextEntry1] : 63F with hx of hypothyroidism, low blood pressure, osteoporosis, kidney stones here to follow up regarding stone prevention. She has been seeing Dr. Martell who has been managing her stones but she was told that she has hypercalciuria and the chlorthalidone that she has been taking is not working.  She completed a 24hr UA with  and was told to start a low oxalate diet and to drink more fluids and start chlorthalidone which she has been taking. We completed another test in January.   No blood in the urine, no dysuria, no LE swelling, no shortness of breath. No flank pain, she has not passed a stone.

## 2024-03-04 NOTE — PHYSICAL EXAM
[General Appearance - Alert] : alert [General Appearance - In No Acute Distress] : in no acute distress [Sclera] : the sclera and conjunctiva were normal [PERRL With Normal Accommodation] : pupils were equal in size, round, and reactive to light [Extraocular Movements] : extraocular movements were intact [Outer Ear] : the ears and nose were normal in appearance [Oropharynx] : the oropharynx was normal [Neck Appearance] : the appearance of the neck was normal [Neck Cervical Mass (___cm)] : no neck mass was observed [Jugular Venous Distention Increased] : there was no jugular-venous distention [Thyroid Nodule] : there were no palpable thyroid nodules [Thyroid Diffuse Enlargement] : the thyroid was not enlarged [Auscultation Breath Sounds / Voice Sounds] : lungs were clear to auscultation bilaterally [Heart Rate And Rhythm] : heart rate was normal and rhythm regular [Heart Sounds] : normal S1 and S2 [Heart Sounds Gallop] : no gallops [Murmurs] : no murmurs [Heart Sounds Pericardial Friction Rub] : no pericardial rub [Full Pulse] : the pedal pulses are present [Edema] : there was no peripheral edema [Bowel Sounds] : normal bowel sounds [Abdomen Soft] : soft [Abdomen Tenderness] : non-tender [Abdomen Mass (___ Cm)] : no abdominal mass palpated [Cervical Lymph Nodes Enlarged Posterior Bilaterally] : posterior cervical [Cervical Lymph Nodes Enlarged Anterior Bilaterally] : anterior cervical [Supraclavicular Lymph Nodes Enlarged Bilaterally] : supraclavicular [No CVA Tenderness] : no ~M costovertebral angle tenderness [No Spinal Tenderness] : no spinal tenderness [Abnormal Walk] : normal gait [Musculoskeletal - Swelling] : no joint swelling seen [Nail Clubbing] : no clubbing  or cyanosis of the fingernails [Motor Tone] : muscle strength and tone were normal [Skin Turgor] : normal skin turgor [Skin Color & Pigmentation] : normal skin color and pigmentation [] : no rash [No Focal Deficits] : no focal deficits [Impaired Insight] : insight and judgment were intact [Oriented To Time, Place, And Person] : oriented to person, place, and time [Affect] : the affect was normal

## 2024-03-04 NOTE — CURRENT MEDS
[TextEntry] : Algae Juan Strontium qhs Synthroid 50mcg Dr. Duncan's MVI Bromeline Tumeric and Marianna Indira C 500mg chlorthalidone 12.5mg po qd

## 2024-03-04 NOTE — PLAN
[TextEntry] : Will increase fluid intake to maintain at least 2L of urine. Will try to avoid salt in diet. Cont chlorthalidone as urine calcium improved. Will add potassium citrate 10meq bid.  Will repeat 24hr UA in ~ 6 months and follow up

## 2024-03-15 ENCOUNTER — APPOINTMENT (OUTPATIENT)
Dept: CARDIOLOGY | Facility: CLINIC | Age: 64
End: 2024-03-15
Payer: COMMERCIAL

## 2024-03-15 VITALS
DIASTOLIC BLOOD PRESSURE: 64 MMHG | BODY MASS INDEX: 19.14 KG/M2 | HEIGHT: 62 IN | WEIGHT: 104 LBS | SYSTOLIC BLOOD PRESSURE: 107 MMHG | HEART RATE: 67 BPM | OXYGEN SATURATION: 99 %

## 2024-03-15 DIAGNOSIS — E78.00 PURE HYPERCHOLESTEROLEMIA, UNSPECIFIED: ICD-10-CM

## 2024-03-15 DIAGNOSIS — R07.9 CHEST PAIN, UNSPECIFIED: ICD-10-CM

## 2024-03-15 PROCEDURE — 99213 OFFICE O/P EST LOW 20 MIN: CPT | Mod: 25

## 2024-03-15 PROCEDURE — 93000 ELECTROCARDIOGRAM COMPLETE: CPT

## 2024-03-15 NOTE — PHYSICAL EXAM
[Well Developed] : well developed [Well Nourished] : well nourished [No Acute Distress] : no acute distress [Normal Conjunctiva] : normal conjunctiva [Normal Venous Pressure] : normal venous pressure [No Carotid Bruit] : no carotid bruit [Normal S1, S2] : normal S1, S2 [No Murmur] : no murmur [No Rub] : no rub [No Gallop] : no gallop [Clear Lung Fields] : clear lung fields [Good Air Entry] : good air entry [No Respiratory Distress] : no respiratory distress  [Soft] : abdomen soft [Non Tender] : non-tender [No Masses/organomegaly] : no masses/organomegaly [Normal Gait] : normal gait [Normal Bowel Sounds] : normal bowel sounds [No Edema] : no edema [No Clubbing] : no clubbing [No Cyanosis] : no cyanosis [No Rash] : no rash [No Varicosities] : no varicosities [No Skin Lesions] : no skin lesions [Moves all extremities] : moves all extremities [No Focal Deficits] : no focal deficits [Normal Speech] : normal speech [Alert and Oriented] : alert and oriented [Normal memory] : normal memory

## 2024-03-15 NOTE — CARDIOLOGY SUMMARY
[de-identified] :  Sinus Rhythm  Diffuse low voltage. -Incomplete right bundle branch block and left axis -anterior fascicular block. -Nonspecific T-abnormality.

## 2024-03-15 NOTE — HISTORY OF PRESENT ILLNESS
[FreeTextEntry1] : Pt presents with long hx of periodic mid sternal chest pain.  no radiation.,  longest duration 5-10 mins.  has to lie down because pain is so bad.  never went to ED.. no cardiac w.u.   no assoc n/v diaphoresis.  no sob,  pain does not radiate no heart murmur/ no rheumatic fever never smoked rare alco nkda c sect, dev sept mother had CVA in her 50s hx of sciatica, billat kidney stones, med managed,  tchol 242  trig 46 hdl 89  3/15/2024  no chest pain since last visit.  normal stress echo with structurally normal heart.  9 mins MPHR 153 ((99%)  no angina.  normal imaging

## 2024-03-15 NOTE — DISCUSSION/SUMMARY
[Improving] : improving [Unlikely Cardiac Ischemia (Low Prob.)] : chest pain unlikely to represent cardiac ischemia (low probability) [None] : There are no changes in medication management [Hyperlipidemia] : hyperlipidemia [Stable] : stable [Diet Modification] : diet modification [Exercise] : exercise [FreeTextEntry1] : atyp chest pain.  non since last visit. normal stress echo,  low CV 10 yr risk.. pt instructed to f.u with pcp, [EKG obtained to assist in diagnosis and management of assessed problem(s)] : EKG obtained to assist in diagnosis and management of assessed problem(s)

## 2024-03-16 ENCOUNTER — TRANSCRIPTION ENCOUNTER (OUTPATIENT)
Age: 64
End: 2024-03-16

## 2024-04-15 ENCOUNTER — APPOINTMENT (OUTPATIENT)
Dept: UROLOGY | Facility: CLINIC | Age: 64
End: 2024-04-15
Payer: COMMERCIAL

## 2024-04-15 ENCOUNTER — OUTPATIENT (OUTPATIENT)
Dept: OUTPATIENT SERVICES | Facility: HOSPITAL | Age: 64
LOS: 1 days | End: 2024-04-15
Payer: COMMERCIAL

## 2024-04-15 DIAGNOSIS — R82.994 HYPERCALCIURIA: ICD-10-CM

## 2024-04-15 DIAGNOSIS — R35.0 FREQUENCY OF MICTURITION: ICD-10-CM

## 2024-04-15 DIAGNOSIS — N20.0 CALCULUS OF KIDNEY: ICD-10-CM

## 2024-04-15 PROCEDURE — 99213 OFFICE O/P EST LOW 20 MIN: CPT

## 2024-04-15 PROCEDURE — 76775 US EXAM ABDO BACK WALL LIM: CPT | Mod: 26

## 2024-04-15 PROCEDURE — 76775 US EXAM ABDO BACK WALL LIM: CPT

## 2024-04-15 PROCEDURE — G2211 COMPLEX E/M VISIT ADD ON: CPT

## 2024-04-15 NOTE — PHYSICAL EXAM
[General Appearance - Well Developed] : well developed [General Appearance - Well Nourished] : well nourished [Normal Appearance] : normal appearance [Abdomen Soft] : soft [Abdomen Tenderness] : non-tender [Costovertebral Angle Tenderness] : no ~M costovertebral angle tenderness [Edema] : no peripheral edema [] : no respiratory distress [Exaggerated Use Of Accessory Muscles For Inspiration] : no accessory muscle use [Oriented To Time, Place, And Person] : oriented to person, place, and time [Normal Station and Gait] : the gait and station were normal for the patient's age

## 2024-04-18 RX ORDER — CHLORTHALIDONE 25 MG/1
25 TABLET ORAL DAILY
Qty: 45 | Refills: 3 | Status: ACTIVE | COMMUNITY
Start: 2023-07-11 | End: 1900-01-01

## 2024-04-23 PROBLEM — N20.0 KIDNEY STONE: Status: ACTIVE | Noted: 2023-05-01

## 2024-04-23 PROBLEM — R82.994 HYPERCALCIURIA: Status: ACTIVE | Noted: 2023-05-24

## 2024-04-23 NOTE — ASSESSMENT
[FreeTextEntry1] : Large stone burden with no prior hx. Has hypercalcemia and low urine volumes as primary risk factor. Started on chlorthalidone and potassium citrate. No change in stone burden --Increasing fluid intake to produce 2 to 2.5 liters of urine per day (approx 3 liters intake), and should be primarily water.  --Calcium intake should be approximately 1000 mg per day.  --Cont chlorthalidone and K citrate --F/u Dr Victor --RTC with renal US and follow-up in 6mo with Dr Hoenig

## 2024-04-23 NOTE — HISTORY OF PRESENT ILLNESS
[FreeTextEntry1] : 63 year old F with cc of stones.   Pt was seen by PCP for routine exam and found to have blood in the urine (14 RBC). No prior hx of stones. No issues with UTIs. No tobacco hx. No exposure hx. No family hx of  malignancy. At baseline, usually goes every 3 hours with increased frequency with exercise, endorses one episode of nocturia/night 4 times out of the week. Denies dysuria, urgency, NICK, gross hematuria. Denies constipation. Drinks about 40 oz of water per day, no coffee/tea. Underwent CTU and cysto for eval. CT with B stones. Reviewed imaging. Pt with RUP x2 (totaling 5mm), RMP x2 (1-3mm each), RLP x1 (6mm), and collection of stones on LLP with predominant stone ~10mm in size. Plan made for 24h urine for eval.  This showed low urine volumes (1.4-1.5L), extremely high urinary calcium (>320) and borderline elevated oxalate. Food diary showed 1100mg/d. SHe was started on chlorthalidone. She did not have significant change and was referred to Dr Victor. She was started on potassium citrate 10meq BID and continued on chlorthalidone.   Comes in today for follow-up. US today continued B stones. RUP up to 7mm, 5mm in RLP and 12mm in LLP. No hydro.

## 2024-04-24 DIAGNOSIS — N20.0 CALCULUS OF KIDNEY: ICD-10-CM

## 2024-04-24 DIAGNOSIS — R82.994 HYPERCALCIURIA: ICD-10-CM

## 2024-07-15 ENCOUNTER — APPOINTMENT (OUTPATIENT)
Dept: ORTHOPEDIC SURGERY | Facility: CLINIC | Age: 64
End: 2024-07-15

## 2024-09-11 ENCOUNTER — APPOINTMENT (OUTPATIENT)
Dept: NEPHROLOGY | Facility: CLINIC | Age: 64
End: 2024-09-11
Payer: MEDICARE

## 2024-09-11 VITALS
WEIGHT: 104.01 LBS | HEIGHT: 62 IN | OXYGEN SATURATION: 99 % | DIASTOLIC BLOOD PRESSURE: 69 MMHG | TEMPERATURE: 98 F | HEART RATE: 78 BPM | SYSTOLIC BLOOD PRESSURE: 112 MMHG | BODY MASS INDEX: 19.14 KG/M2

## 2024-09-11 DIAGNOSIS — R82.994 HYPERCALCIURIA: ICD-10-CM

## 2024-09-11 DIAGNOSIS — N20.0 CALCULUS OF KIDNEY: ICD-10-CM

## 2024-09-11 LAB
APPEARANCE: ABNORMAL
BACTERIA: NEGATIVE /HPF
BILIRUBIN URINE: NEGATIVE
BLOOD URINE: NEGATIVE
CAST: 0 /LPF
COLOR: YELLOW
CREAT SPEC-SCNC: 77 MG/DL
CREAT/PROT UR: 0.1 RATIO
EPITHELIAL CELLS: 1 /HPF
GLUCOSE QUALITATIVE U: NEGATIVE MG/DL
KETONES URINE: NEGATIVE MG/DL
LEUKOCYTE ESTERASE URINE: ABNORMAL
MICROSCOPIC-UA: NORMAL
NITRITE URINE: NEGATIVE
PH URINE: 7
PROT UR-MCNC: 7 MG/DL
PROTEIN URINE: NEGATIVE MG/DL
RED BLOOD CELLS URINE: 2 /HPF
SPECIFIC GRAVITY URINE: 1.02
UROBILINOGEN URINE: 0.2 MG/DL
WHITE BLOOD CELLS URINE: 6 /HPF

## 2024-09-11 PROCEDURE — G2211 COMPLEX E/M VISIT ADD ON: CPT

## 2024-09-11 PROCEDURE — 99205 OFFICE O/P NEW HI 60 MIN: CPT

## 2024-09-11 NOTE — CURRENT MEDS
[TextEntry] : Algae Juan Strontium qhs Synthroid 50mcg Dr. Duncan's MVI Bromeline Tumeric and Marianna chlorthalidone 12.5mg po qd Glucosamine with D3 K citrate 10meq bid

## 2024-09-11 NOTE — HISTORY OF PRESENT ILLNESS
[FreeTextEntry1] : 64F with hx of hypothyroidism, low blood pressure, osteoporosis, kidney stones here to follow up regarding stone prevention. She has been seeing Dr. Martell who has been managing her stones but she was told that she has hypercalciuria and the chlorthalidone. I started her on citrate 10meq bid and she has been taking it.  She completed a 24hr UA in August and submitted a few wks ago. She is still on a low oxalate diet and to drink more fluids and chlorthalidone which she has been taking.   No blood in the urine, no dysuria, no LE swelling, no shortness of breath. No flank pain. She has sciatica pain down her L leg. She has not passed a stone.

## 2024-09-11 NOTE — PLAN
[TextEntry] : Will check labs to screen for CKD today. Will try to attain last LL. Pt will cont low salt and oxalate diet. Eats dark chocolate. Cont to drink enough to maintain at least 2 L of urine.   Monitor labs. She is seeing new urologist with a renal US in October.

## 2024-09-12 LAB
ALBUMIN SERPL ELPH-MCNC: 4.1 G/DL
ANION GAP SERPL CALC-SCNC: 12 MMOL/L
BASOPHILS # BLD AUTO: 0.09 K/UL
BASOPHILS NFR BLD AUTO: 1.1 %
BUN SERPL-MCNC: 17 MG/DL
CALCIUM SERPL-MCNC: 9.5 MG/DL
CHLORIDE SERPL-SCNC: 101 MMOL/L
CO2 SERPL-SCNC: 30 MMOL/L
CREAT SERPL-MCNC: 0.71 MG/DL
EGFR: 94 ML/MIN/1.73M2
EOSINOPHIL # BLD AUTO: 0.29 K/UL
EOSINOPHIL NFR BLD AUTO: 3.7 %
GLUCOSE SERPL-MCNC: 153 MG/DL
HCT VFR BLD CALC: 41.8 %
HGB BLD-MCNC: 13.8 G/DL
IMM GRANULOCYTES NFR BLD AUTO: 0.6 %
LYMPHOCYTES # BLD AUTO: 1.78 K/UL
LYMPHOCYTES NFR BLD AUTO: 22.6 %
MAN DIFF?: NORMAL
MCHC RBC-ENTMCNC: 28.5 PG
MCHC RBC-ENTMCNC: 33 GM/DL
MCV RBC AUTO: 86.2 FL
MONOCYTES # BLD AUTO: 0.54 K/UL
MONOCYTES NFR BLD AUTO: 6.9 %
NEUTROPHILS # BLD AUTO: 5.13 K/UL
NEUTROPHILS NFR BLD AUTO: 65.1 %
PHOSPHATE SERPL-MCNC: 3.6 MG/DL
PLATELET # BLD AUTO: 347 K/UL
POTASSIUM SERPL-SCNC: 3.8 MMOL/L
RBC # BLD: 4.85 M/UL
RBC # FLD: 12.7 %
SODIUM SERPL-SCNC: 143 MMOL/L
WBC # FLD AUTO: 7.88 K/UL

## 2024-09-19 ENCOUNTER — TRANSCRIPTION ENCOUNTER (OUTPATIENT)
Age: 64
End: 2024-09-19

## 2024-10-16 ENCOUNTER — APPOINTMENT (OUTPATIENT)
Dept: UROLOGY | Facility: CLINIC | Age: 64
End: 2024-10-16
Payer: MEDICARE

## 2024-10-16 ENCOUNTER — OUTPATIENT (OUTPATIENT)
Dept: OUTPATIENT SERVICES | Facility: HOSPITAL | Age: 64
LOS: 1 days | End: 2024-10-16
Payer: COMMERCIAL

## 2024-10-16 DIAGNOSIS — R82.994 HYPERCALCIURIA: ICD-10-CM

## 2024-10-16 DIAGNOSIS — N20.0 CALCULUS OF KIDNEY: ICD-10-CM

## 2024-10-16 DIAGNOSIS — R35.0 FREQUENCY OF MICTURITION: ICD-10-CM

## 2024-10-16 PROCEDURE — 76770 US EXAM ABDO BACK WALL COMP: CPT | Mod: 26

## 2024-10-16 PROCEDURE — 76770 US EXAM ABDO BACK WALL COMP: CPT

## 2024-10-16 PROCEDURE — 99203 OFFICE O/P NEW LOW 30 MIN: CPT | Mod: 25

## 2024-10-22 DIAGNOSIS — N20.0 CALCULUS OF KIDNEY: ICD-10-CM

## 2024-10-22 DIAGNOSIS — R82.994 HYPERCALCIURIA: ICD-10-CM

## 2024-12-17 ENCOUNTER — APPOINTMENT (OUTPATIENT)
Dept: UROLOGY | Facility: CLINIC | Age: 64
End: 2024-12-17
Payer: MEDICARE

## 2024-12-17 DIAGNOSIS — N20.0 CALCULUS OF KIDNEY: ICD-10-CM

## 2024-12-17 DIAGNOSIS — R82.994 HYPERCALCIURIA: ICD-10-CM

## 2024-12-17 PROCEDURE — 99442: CPT | Mod: 93

## 2025-01-03 ENCOUNTER — TRANSCRIPTION ENCOUNTER (OUTPATIENT)
Age: 65
End: 2025-01-03

## 2025-01-22 ENCOUNTER — NON-APPOINTMENT (OUTPATIENT)
Age: 65
End: 2025-01-22

## 2025-02-04 ENCOUNTER — APPOINTMENT (OUTPATIENT)
Dept: UROLOGY | Facility: CLINIC | Age: 65
End: 2025-02-04

## 2025-02-11 ENCOUNTER — APPOINTMENT (OUTPATIENT)
Dept: UROLOGY | Facility: CLINIC | Age: 65
End: 2025-02-11
Payer: MEDICARE

## 2025-02-11 DIAGNOSIS — R82.994 HYPERCALCIURIA: ICD-10-CM

## 2025-02-11 DIAGNOSIS — N20.0 CALCULUS OF KIDNEY: ICD-10-CM

## 2025-02-11 DIAGNOSIS — M81.0 AGE-RELATED OSTEOPOROSIS W/OUT CURRENT PATHOLOGICAL FRACTURE: ICD-10-CM

## 2025-02-11 PROCEDURE — 99214 OFFICE O/P EST MOD 30 MIN: CPT | Mod: 2W

## 2025-04-16 ENCOUNTER — OUTPATIENT (OUTPATIENT)
Dept: OUTPATIENT SERVICES | Facility: HOSPITAL | Age: 65
LOS: 1 days | End: 2025-04-16
Payer: MEDICARE

## 2025-04-16 ENCOUNTER — APPOINTMENT (OUTPATIENT)
Dept: UROLOGY | Facility: CLINIC | Age: 65
End: 2025-04-16
Payer: MEDICARE

## 2025-04-16 DIAGNOSIS — N20.0 CALCULUS OF KIDNEY: ICD-10-CM

## 2025-04-16 DIAGNOSIS — R35.0 FREQUENCY OF MICTURITION: ICD-10-CM

## 2025-04-16 DIAGNOSIS — R82.994 HYPERCALCIURIA: ICD-10-CM

## 2025-04-16 PROCEDURE — 99213 OFFICE O/P EST LOW 20 MIN: CPT | Mod: 25

## 2025-04-16 PROCEDURE — 76770 US EXAM ABDO BACK WALL COMP: CPT

## 2025-04-16 PROCEDURE — 76770 US EXAM ABDO BACK WALL COMP: CPT | Mod: 26

## 2025-04-17 DIAGNOSIS — N20.0 CALCULUS OF KIDNEY: ICD-10-CM

## 2025-04-17 DIAGNOSIS — R82.994 HYPERCALCIURIA: ICD-10-CM

## 2025-04-26 ENCOUNTER — NON-APPOINTMENT (OUTPATIENT)
Age: 65
End: 2025-04-26

## 2025-04-28 ENCOUNTER — APPOINTMENT (OUTPATIENT)
Dept: NEPHROLOGY | Facility: CLINIC | Age: 65
End: 2025-04-28
Payer: MEDICARE

## 2025-04-28 VITALS
HEIGHT: 62 IN | TEMPERATURE: 97.6 F | OXYGEN SATURATION: 95 % | BODY MASS INDEX: 19.14 KG/M2 | SYSTOLIC BLOOD PRESSURE: 120 MMHG | HEART RATE: 84 BPM | DIASTOLIC BLOOD PRESSURE: 82 MMHG | WEIGHT: 104 LBS

## 2025-04-28 DIAGNOSIS — N20.0 CALCULUS OF KIDNEY: ICD-10-CM

## 2025-04-28 DIAGNOSIS — R82.994 HYPERCALCIURIA: ICD-10-CM

## 2025-04-28 PROCEDURE — 99215 OFFICE O/P EST HI 40 MIN: CPT

## 2025-04-28 PROCEDURE — G2211 COMPLEX E/M VISIT ADD ON: CPT

## 2025-09-18 ENCOUNTER — APPOINTMENT (OUTPATIENT)
Dept: ORTHOPEDIC SURGERY | Facility: CLINIC | Age: 65
End: 2025-09-18
Payer: MEDICARE

## 2025-09-18 DIAGNOSIS — M17.11 UNILATERAL PRIMARY OSTEOARTHRITIS, RIGHT KNEE: ICD-10-CM

## 2025-09-18 DIAGNOSIS — Z00.00 ENCOUNTER FOR GENERAL ADULT MEDICAL EXAMINATION W/OUT ABNORMAL FINDINGS: ICD-10-CM

## 2025-09-18 PROCEDURE — 99203 OFFICE O/P NEW LOW 30 MIN: CPT

## 2025-09-18 PROCEDURE — 73562 X-RAY EXAM OF KNEE 3: CPT | Mod: RT
